# Patient Record
Sex: FEMALE | Race: WHITE | Employment: FULL TIME | URBAN - METROPOLITAN AREA
[De-identification: names, ages, dates, MRNs, and addresses within clinical notes are randomized per-mention and may not be internally consistent; named-entity substitution may affect disease eponyms.]

---

## 2017-01-02 ENCOUNTER — HOSPITAL ENCOUNTER (OUTPATIENT)
Dept: ULTRASOUND IMAGING | Facility: HOSPITAL | Age: 52
Discharge: HOME | End: 2017-01-02
Attending: OTOLARYNGOLOGY
Payer: COMMERCIAL

## 2017-01-02 ENCOUNTER — HOSPITAL ENCOUNTER (OUTPATIENT)
Dept: CT IMAGING | Facility: HOSPITAL | Age: 52
Discharge: HOME | End: 2017-01-02
Attending: OTOLARYNGOLOGY
Payer: COMMERCIAL

## 2017-01-02 ENCOUNTER — LAB ENCOUNTER (OUTPATIENT)
Dept: LAB | Facility: HOSPITAL | Age: 52
End: 2017-01-02
Attending: OTOLARYNGOLOGY
Payer: COMMERCIAL

## 2017-01-02 DIAGNOSIS — E21.3 HYPERPARATHYROIDISM (HCC): ICD-10-CM

## 2017-01-02 DIAGNOSIS — E21.3 HYPERPARATHYROIDISM, UNSPECIFIED (HCC): Primary | ICD-10-CM

## 2017-01-02 DIAGNOSIS — E07.9 THYROID DYSFUNCTION: ICD-10-CM

## 2017-01-02 DIAGNOSIS — D64.89 ALBRIGHT'S ANEMIA (ANEMIA ASSOCIATED WITH HYPERPARATHYROIDISM) (HCC): ICD-10-CM

## 2017-01-02 DIAGNOSIS — E21.3 ALBRIGHT'S ANEMIA (ANEMIA ASSOCIATED WITH HYPERPARATHYROIDISM) (HCC): ICD-10-CM

## 2017-01-02 DIAGNOSIS — E83.52 HYPERCALCEMIA: ICD-10-CM

## 2017-01-02 DIAGNOSIS — R74.8 ELEVATED SERUM GGT LEVEL: ICD-10-CM

## 2017-01-02 DIAGNOSIS — E04.1 THYROID NODULE: ICD-10-CM

## 2017-01-02 DIAGNOSIS — R79.89 LOW VITAMIN D LEVEL: ICD-10-CM

## 2017-01-02 LAB
25-HYDROXYVITAMIN D (TOTAL): 12 NG/ML (ref 30–100)
FREE T4: 0.8 NG/DL (ref 0.9–1.8)
GAMMA GLUTAMYL TRANSFERASE: 169 U/L (ref 5–55)
IONIZED CALCIUM: 1.36 MMOL/L (ref 1.12–1.32)
PTH-INTACT SERPL-MCNC: 110.9 PG/ML (ref 11.1–79.5)
TSI SER-ACNC: 0.8 MIU/ML (ref 0.35–5.5)

## 2017-01-02 PROCEDURE — 82977 ASSAY OF GGT: CPT

## 2017-01-02 PROCEDURE — 36415 COLL VENOUS BLD VENIPUNCTURE: CPT

## 2017-01-02 PROCEDURE — 70492 CT SFT TSUE NCK W/O & W/DYE: CPT

## 2017-01-02 PROCEDURE — 82330 ASSAY OF CALCIUM: CPT

## 2017-01-02 PROCEDURE — 84443 ASSAY THYROID STIM HORMONE: CPT

## 2017-01-02 PROCEDURE — 76536 US EXAM OF HEAD AND NECK: CPT

## 2017-01-02 PROCEDURE — 82306 VITAMIN D 25 HYDROXY: CPT

## 2017-01-02 PROCEDURE — 84439 ASSAY OF FREE THYROXINE: CPT

## 2017-01-02 PROCEDURE — 83970 ASSAY OF PARATHORMONE: CPT

## 2017-01-03 DIAGNOSIS — E55.9 VITAMIN D DEFICIENCY: ICD-10-CM

## 2017-01-03 DIAGNOSIS — E21.3 HYPERPARATHYROIDISM (HCC): Primary | ICD-10-CM

## 2017-01-03 DIAGNOSIS — R79.89 ELEVATED PTHRP LEVEL: ICD-10-CM

## 2017-01-03 RX ORDER — ERGOCALCIFEROL 1.25 MG/1
50000 CAPSULE ORAL WEEKLY
Qty: 8 CAPSULE | Refills: 0 | Status: SHIPPED | OUTPATIENT
Start: 2017-01-03 | End: 2017-02-02

## 2017-01-05 NOTE — PROGRESS NOTES
Quick Note:    Please inform us thyroid showing right 1.8cm nodule and 6mm nodule, left 6mm nodule recommend FNA to right 1.8cm nodule  ______

## 2017-01-05 NOTE — PROGRESS NOTES
Quick Note:    4DCT parathyroid is showing a possible parathyroid inferior to the right thyroid 9mm recommend follow up with Dr Oswaldo Estrella to review films and results and further discuss  ______

## 2017-01-05 NOTE — PATIENT INSTRUCTIONS
[address] MEDICINE    HAVING A URINARY CATHETER AFTER SURGERY    What is a urinary catheter? A catheter is a soft tube used to drain urine from your bladder. Why do I need a catheter?   A urinary catheter is used to help with heali following tips:  · Urinate normally then stand up, walk around for a minute or two, sit back down on the commode and attempt to urinate (double void). · Sit in a tub of warm water and try to urinate in the tub.   · Run warm water over your vaginal area whi are given an appointment to come back to discuss the results and any appropriate treatment recommendations. Please do not hesitate to contact our office with any questions or concerns at [phone].     I acknowledge that I have received verbal and wri

## 2017-01-10 ENCOUNTER — OFFICE VISIT (OUTPATIENT)
Dept: UROLOGY | Facility: HOSPITAL | Age: 52
End: 2017-01-10
Attending: OBSTETRICS & GYNECOLOGY
Payer: COMMERCIAL

## 2017-01-10 VITALS — DIASTOLIC BLOOD PRESSURE: 70 MMHG | SYSTOLIC BLOOD PRESSURE: 118 MMHG

## 2017-01-10 DIAGNOSIS — R35.0 FREQUENCY OF URINATION: Primary | ICD-10-CM

## 2017-01-10 DIAGNOSIS — N95.2 POSTMENOPAUSAL ATROPHIC VAGINITIS: ICD-10-CM

## 2017-01-10 LAB
CONTROL RUN WITHIN 24 HOURS?: YES
LEUKOCYTE ESTERASE URINE: NEGATIVE
NITRITE URINE: NEGATIVE

## 2017-01-10 PROCEDURE — 51741 ELECTRO-UROFLOWMETRY FIRST: CPT

## 2017-01-10 PROCEDURE — 51797 INTRAABDOMINAL PRESSURE TEST: CPT

## 2017-01-10 PROCEDURE — 51729 CYSTOMETROGRAM W/VP&UP: CPT

## 2017-01-10 PROCEDURE — 51784 ANAL/URINARY MUSCLE STUDY: CPT

## 2017-01-10 RX ORDER — ESTRADIOL 0.1 MG/G
CREAM VAGINAL
Qty: 1 TUBE | Refills: 3 | Status: SHIPPED | OUTPATIENT
Start: 2017-01-10

## 2017-01-10 NOTE — PROCEDURES
.Patient here for urodynamic testing. Procedure explained and confirmed by patient. See evaluation form for results. Both verbal and written discharge instructions were given.   Patient tolerated procedure well and will follow up with Dr. Jody Avalos Other  Void:   [x]  Typical  []  Atypical    Additional Notes:    CYSTOMETRY:  Urethral Catheter:  Fr 7 / tdoc  Abd Catheter:     Fr 7 / tdoc   Infusion:  Water Rate 50 mL/min  Temp:  Room  Position:  [x]  Sit  []  Stand  []  Supine  First sensation:   60 Ultrasound    []  Urinalysis  []  CT Abd/Pelvis    []  Culture  []  Pelvic Ultrasound/Sonohysterogram  []  Cystoscopy  []  Magnetic Resonance Imaging Scan    BLADDER AND BOWEL THERAPIES:  []  Timed voiding/Bladder re-training  []  Pelvic Floor Exercises  [

## 2017-01-24 NOTE — PROGRESS NOTES
Quick Note:    Pt returned phone call and went over results. Pt stated she was told by Hilton Castillo to f/u in office to discuss results and scheduled surgery.   ______

## 2017-01-24 NOTE — PROGRESS NOTES
Quick Note:    Pt returned phone call and went over results. Pt stated she was told by Kaila Thakur to f/u in office to discuss results and scheduled surgery.   ______

## 2017-02-22 ENCOUNTER — TELEPHONE (OUTPATIENT)
Dept: FAMILY MEDICINE CLINIC | Facility: CLINIC | Age: 52
End: 2017-02-22

## 2017-02-22 NOTE — TELEPHONE ENCOUNTER
Incoming letter received from [employer]. H&P request.  Patient is scheduled to have Right parathyroidectomy possible right thyroid lobectomy on 4/7/2017 with Dr. Augustin Dozier.   Outgoing call to patient, LM to CB, concerning pre-op exam.

## 2017-02-22 NOTE — TELEPHONE ENCOUNTER
I spoke with patient, appointment scheduled 3/23/2017 with Dino Dodd. Paperwork in pre-op folder(Gene).

## 2017-03-02 ENCOUNTER — HOSPITAL ENCOUNTER (OUTPATIENT)
Dept: ULTRASOUND IMAGING | Facility: HOSPITAL | Age: 52
Discharge: HOME | End: 2017-03-02
Attending: OTOLARYNGOLOGY
Payer: COMMERCIAL

## 2017-03-02 DIAGNOSIS — E04.1 THYROID NODULE: ICD-10-CM

## 2017-03-02 PROCEDURE — 76942 ECHO GUIDE FOR BIOPSY: CPT

## 2017-03-02 PROCEDURE — 10022 US FNA THYROID SH(CPT=10022/76942): CPT

## 2017-03-02 PROCEDURE — 88173 CYTOPATH EVAL FNA REPORT: CPT | Performed by: OTOLARYNGOLOGY

## 2017-03-02 NOTE — PROCEDURES
[facility]  Procedure Note    Oralia Ganser Patient Status:  Outpatient    1965 MRN [id number]   Location [address] [place] [address] Attending Messi Pedro MD   Hosp Day # 0 PCP Nicole Blizzard, MD     BIOPSY NEEDLE: 22 gauge  SPECIMEN T

## 2017-03-03 PROBLEM — K64.8 INTERNAL HEMORRHOIDS WITHOUT COMPLICATION: Status: ACTIVE | Noted: 2017-03-03

## 2017-03-14 NOTE — PROGRESS NOTES
Quick Note:    Left detailed message with results and recommendations on patient's VM, ok per [facility] phone consent. Order for thyroid US was mailed. Instructed patient to call back with any questions or concerns.   ______

## 2017-03-23 ENCOUNTER — TELEPHONE (OUTPATIENT)
Dept: FAMILY MEDICINE CLINIC | Facility: CLINIC | Age: 52
End: 2017-03-23

## 2017-03-23 ENCOUNTER — OFFICE VISIT (OUTPATIENT)
Dept: FAMILY MEDICINE CLINIC | Facility: CLINIC | Age: 52
End: 2017-03-23

## 2017-03-23 VITALS
DIASTOLIC BLOOD PRESSURE: 70 MMHG | RESPIRATION RATE: 16 BRPM | TEMPERATURE: 98 F | SYSTOLIC BLOOD PRESSURE: 108 MMHG | HEART RATE: 85 BPM | HEIGHT: 64 IN | BODY MASS INDEX: 29.71 KG/M2 | WEIGHT: 174 LBS | OXYGEN SATURATION: 98 %

## 2017-03-23 DIAGNOSIS — Z01.818 PREOPERATIVE CLEARANCE: Primary | ICD-10-CM

## 2017-03-23 DIAGNOSIS — Z01.810 PREOPERATIVE CARDIOVASCULAR EXAMINATION: ICD-10-CM

## 2017-03-23 DIAGNOSIS — E21.3 HYPERPARATHYROIDISM (HCC): ICD-10-CM

## 2017-03-23 PROCEDURE — 99242 OFF/OP CONSLTJ NEW/EST SF 20: CPT | Performed by: FAMILY MEDICINE

## 2017-03-23 NOTE — TELEPHONE ENCOUNTER
Please call patient. I spoke to Dr. Margaret Mosher about her upcoming surgery. He would like her to have an EKG preoperatively.   I have ordered it for [facility].

## 2017-03-23 NOTE — TELEPHONE ENCOUNTER
Incoming call from [facility](preadmission test dept), states patient has not had a pre-op medical survey yet, however looking over chart patient may not need any additional testing, unless PCP requests.   They will finalize that determination after they have c

## 2017-03-23 NOTE — H&P
Viry Wilks is a 45year old female who presents for a pre-operative physical exam. Patient is to have right parathyroidectomy with possible right thyroid lobectomy on April 14, 2017 at [facility] with Dr. Duyen Crow.     HPI:   Pre op H&P requested by EXAM:   /70 mmHg  Pulse 85  Temp(Src) 97.7 °F (36.5 °C) (Oral)  Resp 16  Ht 64\"  Wt 174 lb  BMI 29.85 kg/m2  SpO2 98%  GENERAL: well developed, well nourished,in no apparent distress  SKIN: no rashes,no suspicious lesions  HEENT: atraumatic, normo

## 2017-03-24 ENCOUNTER — OFFICE VISIT (OUTPATIENT)
Dept: UROLOGY | Facility: HOSPITAL | Age: 52
End: 2017-03-24
Attending: OBSTETRICS & GYNECOLOGY
Payer: COMMERCIAL

## 2017-03-24 VITALS
WEIGHT: 175 LBS | SYSTOLIC BLOOD PRESSURE: 114 MMHG | HEIGHT: 64 IN | BODY MASS INDEX: 29.88 KG/M2 | DIASTOLIC BLOOD PRESSURE: 70 MMHG

## 2017-03-24 DIAGNOSIS — R35.0 FREQUENCY OF URINATION: Primary | ICD-10-CM

## 2017-03-24 DIAGNOSIS — N39.41 URGE INCONTINENCE: ICD-10-CM

## 2017-03-24 DIAGNOSIS — R39.15 URINARY URGENCY: ICD-10-CM

## 2017-03-24 DIAGNOSIS — N95.2 POSTMENOPAUSAL ATROPHIC VAGINITIS: ICD-10-CM

## 2017-03-24 LAB
CONTROL RUN WITHIN 24 HOURS?: YES
LEUKOCYTE ESTERASE URINE: NEGATIVE
NITRITE URINE: NEGATIVE

## 2017-03-24 PROCEDURE — 81002 URINALYSIS NONAUTO W/O SCOPE: CPT

## 2017-03-24 PROCEDURE — 99211 OFF/OP EST MAY X REQ PHY/QHP: CPT

## 2017-03-24 PROCEDURE — 51700 IRRIGATION OF BLADDER: CPT

## 2017-03-24 NOTE — PROGRESS NOTES
Pt presents w/ initial c/o urinary frequency & urgency (reports pain as bladder fills)  Urodynamic testing undergone without complication.   Results reviewed with patient  161/60cc & 489/5cc  No DO  No SHALOM  [facility] 325cc    Discussed with patient mgmt options f

## 2017-03-24 NOTE — PROCEDURES
..Patient here for instill #1 . Patient reports feeling urgency and frequency, voids less than every hour. Instill given per protocol. Patient catheterized for a residual of 20 ml. Chemstrip performed. Patient tolerated procedure well.   Next instill s

## 2017-03-24 NOTE — PATIENT INSTRUCTIONS
[address] [facility] [address] MEDICINE    TIMED VOIDING    For women experiencing urinary incontinence from overactive bladder or pelvic weakness, retraining the bladder is of great importance. The bladder responds very well to this technique.   For papi candy or take small sips of water if you become thirsty instead of drinking large amounts. Diuretic medications such as high blood pressure medicine should be taken during the daytime if possible.     When you are able to hold your urine without difficulty

## 2017-03-28 ENCOUNTER — APPOINTMENT (OUTPATIENT)
Dept: LAB | Facility: HOSPITAL | Age: 52
End: 2017-03-28
Attending: FAMILY MEDICINE
Payer: COMMERCIAL

## 2017-03-28 DIAGNOSIS — Z01.810 PREOPERATIVE CARDIOVASCULAR EXAMINATION: ICD-10-CM

## 2017-03-28 LAB
ATRIAL RATE: 84 BPM
P AXIS: 72 DEGREES
P-R INTERVAL: 152 MS
Q-T INTERVAL: 360 MS
QRS DURATION: 80 MS
QTC CALCULATION (BEZET): 425 MS
R AXIS: 66 DEGREES
T AXIS: 60 DEGREES
VENTRICULAR RATE: 84 BPM

## 2017-03-28 PROCEDURE — 93005 ELECTROCARDIOGRAM TRACING: CPT

## 2017-03-28 PROCEDURE — 93010 ELECTROCARDIOGRAM REPORT: CPT | Performed by: INTERNAL MEDICINE

## 2017-03-30 ENCOUNTER — OFFICE VISIT (OUTPATIENT)
Dept: UROLOGY | Facility: HOSPITAL | Age: 52
End: 2017-03-30
Attending: OBSTETRICS & GYNECOLOGY
Payer: COMMERCIAL

## 2017-03-30 VITALS
BODY MASS INDEX: 29.88 KG/M2 | DIASTOLIC BLOOD PRESSURE: 76 MMHG | WEIGHT: 175 LBS | SYSTOLIC BLOOD PRESSURE: 112 MMHG | HEIGHT: 64 IN

## 2017-03-30 DIAGNOSIS — R35.0 URINARY FREQUENCY: Primary | ICD-10-CM

## 2017-03-30 LAB
CONTROL RUN WITHIN 24 HOURS?: YES
LEUKOCYTE ESTERASE URINE: NEGATIVE
NITRITE URINE: NEGATIVE

## 2017-03-30 PROCEDURE — 51700 IRRIGATION OF BLADDER: CPT

## 2017-03-30 PROCEDURE — 81002 URINALYSIS NONAUTO W/O SCOPE: CPT

## 2017-03-30 RX ORDER — LIDOCAINE HYDROCHLORIDE 20 MG/ML
10 JELLY TOPICAL ONCE
Status: COMPLETED | OUTPATIENT
Start: 2017-03-30 | End: 2017-03-30

## 2017-03-30 RX ORDER — HEPARIN SODIUM 10000 [USP'U]/ML
40000 INJECTION, SOLUTION INTRAVENOUS; SUBCUTANEOUS ONCE
Status: COMPLETED | OUTPATIENT
Start: 2017-03-30 | End: 2017-03-30

## 2017-03-30 RX ORDER — 0.9 % SODIUM CHLORIDE 0.9 %
17 VIAL (ML) INJECTION ONCE
Status: COMPLETED | OUTPATIENT
Start: 2017-03-30 | End: 2017-03-30

## 2017-03-30 RX ADMIN — LIDOCAINE HYDROCHLORIDE 10 ML: 20 JELLY TOPICAL at 10:36:00

## 2017-03-30 RX ADMIN — HEPARIN SODIUM 40000 UNITS: 10000 INJECTION, SOLUTION INTRAVENOUS; SUBCUTANEOUS at 10:36:00

## 2017-03-30 RX ADMIN — 0.9 % SODIUM CHLORIDE 17 ML: 0.9 % VIAL (ML) INJECTION at 10:36:00

## 2017-03-30 NOTE — PROCEDURES
.Patient here for instill #2. Patient reports feeling frequency has decreased since last week -. Instill given per protocol. Patient catheterized for a residual of 120ml. Chemstrip performed. Patient tolerated procedure well.   Next instill scheduled f

## 2017-04-06 ENCOUNTER — OFFICE VISIT (OUTPATIENT)
Dept: UROLOGY | Facility: HOSPITAL | Age: 52
End: 2017-04-06
Attending: OBSTETRICS & GYNECOLOGY
Payer: COMMERCIAL

## 2017-04-06 VITALS
RESPIRATION RATE: 16 BRPM | BODY MASS INDEX: 30 KG/M2 | WEIGHT: 175 LBS | SYSTOLIC BLOOD PRESSURE: 110 MMHG | DIASTOLIC BLOOD PRESSURE: 60 MMHG

## 2017-04-06 DIAGNOSIS — R35.0 URINARY FREQUENCY: Primary | ICD-10-CM

## 2017-04-06 DIAGNOSIS — N81.84 PELVIC MUSCLE WASTING: ICD-10-CM

## 2017-04-06 DIAGNOSIS — N39.41 URGE INCONTINENCE: ICD-10-CM

## 2017-04-06 DIAGNOSIS — M62.838 LEVATOR SPASM: ICD-10-CM

## 2017-04-06 PROCEDURE — 81002 URINALYSIS NONAUTO W/O SCOPE: CPT

## 2017-04-06 PROCEDURE — 51700 IRRIGATION OF BLADDER: CPT

## 2017-04-06 RX ORDER — LIDOCAINE HYDROCHLORIDE 20 MG/ML
10 JELLY TOPICAL ONCE
Status: COMPLETED | OUTPATIENT
Start: 2017-04-06 | End: 2017-04-06

## 2017-04-06 RX ORDER — HEPARIN SODIUM 10000 [USP'U]/ML
40000 INJECTION, SOLUTION INTRAVENOUS; SUBCUTANEOUS ONCE
Status: COMPLETED | OUTPATIENT
Start: 2017-04-06 | End: 2017-04-06

## 2017-04-06 RX ORDER — 0.9 % SODIUM CHLORIDE 0.9 %
17 VIAL (ML) INJECTION ONCE
Status: COMPLETED | OUTPATIENT
Start: 2017-04-06 | End: 2017-04-06

## 2017-04-06 RX ADMIN — LIDOCAINE HYDROCHLORIDE 10 ML: 20 JELLY TOPICAL at 09:43:00

## 2017-04-06 RX ADMIN — 0.9 % SODIUM CHLORIDE 17 ML: 0.9 % VIAL (ML) INJECTION at 09:44:00

## 2017-04-06 RX ADMIN — HEPARIN SODIUM 40000 UNITS: 10000 INJECTION, SOLUTION INTRAVENOUS; SUBCUTANEOUS at 09:43:00

## 2017-04-06 NOTE — PROGRESS NOTES
..Patient here for instill #3. Patient reports feeling less urgency and frequency, now voiding every 2+ hours. Instill given per protocol. Patient catheterized for a residual of 50 ml. Chemstrip performed. Patient tolerated procedure well.   Next inst

## 2017-04-13 ENCOUNTER — OFFICE VISIT (OUTPATIENT)
Dept: UROLOGY | Facility: HOSPITAL | Age: 52
End: 2017-04-13
Attending: OBSTETRICS & GYNECOLOGY
Payer: COMMERCIAL

## 2017-04-13 VITALS
BODY MASS INDEX: 29.88 KG/M2 | WEIGHT: 175 LBS | DIASTOLIC BLOOD PRESSURE: 78 MMHG | SYSTOLIC BLOOD PRESSURE: 118 MMHG | HEIGHT: 64 IN

## 2017-04-13 DIAGNOSIS — N95.2 POSTMENOPAUSAL ATROPHIC VAGINITIS: ICD-10-CM

## 2017-04-13 DIAGNOSIS — R35.0 URINARY FREQUENCY: Primary | ICD-10-CM

## 2017-04-13 DIAGNOSIS — M62.838 LEVATOR SPASM: ICD-10-CM

## 2017-04-13 PROCEDURE — 81002 URINALYSIS NONAUTO W/O SCOPE: CPT

## 2017-04-13 PROCEDURE — 51700 IRRIGATION OF BLADDER: CPT

## 2017-04-13 RX ORDER — LIDOCAINE HYDROCHLORIDE 20 MG/ML
10 JELLY TOPICAL ONCE
Status: COMPLETED | OUTPATIENT
Start: 2017-04-13 | End: 2017-04-13

## 2017-04-13 RX ORDER — 0.9 % SODIUM CHLORIDE 0.9 %
17 VIAL (ML) INJECTION ONCE
Status: COMPLETED | OUTPATIENT
Start: 2017-04-13 | End: 2017-04-13

## 2017-04-13 RX ORDER — HEPARIN SODIUM 10000 [USP'U]/ML
40000 INJECTION, SOLUTION INTRAVENOUS; SUBCUTANEOUS ONCE
Status: COMPLETED | OUTPATIENT
Start: 2017-04-13 | End: 2017-04-13

## 2017-04-13 RX ADMIN — 0.9 % SODIUM CHLORIDE 17 ML: 0.9 % VIAL (ML) INJECTION at 09:34:00

## 2017-04-13 RX ADMIN — HEPARIN SODIUM 40000 UNITS: 10000 INJECTION, SOLUTION INTRAVENOUS; SUBCUTANEOUS at 09:34:00

## 2017-04-13 RX ADMIN — LIDOCAINE HYDROCHLORIDE 10 ML: 20 JELLY TOPICAL at 09:34:00

## 2017-04-13 NOTE — PROCEDURES
.Patient here for instill # 4. Patient reports feeling like she can hold urine longer and her symptoms are tapering off. Instill given per protocol. Patient catheterized for a residual of 5 ml. Chemstrip performed. Patient tolerated procedure well.   Casilda Koyanagi

## 2017-04-14 ENCOUNTER — ANESTHESIA EVENT (OUTPATIENT)
Dept: SURGERY | Facility: HOSPITAL | Age: 52
End: 2017-04-14
Payer: COMMERCIAL

## 2017-04-14 ENCOUNTER — HOSPITAL ENCOUNTER (OUTPATIENT)
Facility: HOSPITAL | Age: 52
Discharge: HOME | End: 2017-04-15
Attending: OTOLARYNGOLOGY | Admitting: OTOLARYNGOLOGY
Payer: COMMERCIAL

## 2017-04-14 ENCOUNTER — ANESTHESIA (OUTPATIENT)
Dept: SURGERY | Facility: HOSPITAL | Age: 52
End: 2017-04-14
Payer: COMMERCIAL

## 2017-04-14 ENCOUNTER — SURGERY (OUTPATIENT)
Age: 52
End: 2017-04-14

## 2017-04-14 DIAGNOSIS — D35.1 BENIGN NEOPLASM OF PARATHYROID GLAND: ICD-10-CM

## 2017-04-14 DIAGNOSIS — E21.3 HYPERPARATHYROIDISM, UNSPECIFIED (HCC): ICD-10-CM

## 2017-04-14 DIAGNOSIS — E04.1 NONTOXIC UNINODULAR GOITER: ICD-10-CM

## 2017-04-14 PROCEDURE — 83970 ASSAY OF PARATHORMONE: CPT | Performed by: OTOLARYNGOLOGY

## 2017-04-14 PROCEDURE — 82310 ASSAY OF CALCIUM: CPT | Performed by: OTOLARYNGOLOGY

## 2017-04-14 PROCEDURE — 0GBN0ZZ EXCISION OF RIGHT INFERIOR PARATHYROID GLAND, OPEN APPROACH: ICD-10-PCS | Performed by: OTOLARYNGOLOGY

## 2017-04-14 PROCEDURE — 83735 ASSAY OF MAGNESIUM: CPT | Performed by: OTOLARYNGOLOGY

## 2017-04-14 PROCEDURE — 88305 TISSUE EXAM BY PATHOLOGIST: CPT | Performed by: OTOLARYNGOLOGY

## 2017-04-14 PROCEDURE — 88331 PATH CONSLTJ SURG 1 BLK 1SPC: CPT | Performed by: OTOLARYNGOLOGY

## 2017-04-14 RX ORDER — CALCIUM CARBONATE 500(1250)
1000 TABLET ORAL
Status: DISCONTINUED | OUTPATIENT
Start: 2017-04-14 | End: 2017-04-15

## 2017-04-14 RX ORDER — ACETAMINOPHEN 325 MG/1
650 TABLET ORAL EVERY 4 HOURS PRN
Status: DISCONTINUED | OUTPATIENT
Start: 2017-04-14 | End: 2017-04-15

## 2017-04-14 RX ORDER — HYDROMORPHONE HYDROCHLORIDE 1 MG/ML
0.4 INJECTION, SOLUTION INTRAMUSCULAR; INTRAVENOUS; SUBCUTANEOUS EVERY 5 MIN PRN
Status: DISCONTINUED | OUTPATIENT
Start: 2017-04-14 | End: 2017-04-14 | Stop reason: HOSPADM

## 2017-04-14 RX ORDER — DEXTROSE, SODIUM CHLORIDE, SODIUM LACTATE, POTASSIUM CHLORIDE, AND CALCIUM CHLORIDE 5; .6; .31; .03; .02 G/100ML; G/100ML; G/100ML; G/100ML; G/100ML
INJECTION, SOLUTION INTRAVENOUS CONTINUOUS
Status: DISCONTINUED | OUTPATIENT
Start: 2017-04-14 | End: 2017-04-15

## 2017-04-14 RX ORDER — CALCIUM CARBONATE 500(1250)
1000 TABLET ORAL ONCE
Status: COMPLETED | OUTPATIENT
Start: 2017-04-14 | End: 2017-04-14

## 2017-04-14 RX ORDER — ONDANSETRON 2 MG/ML
4 INJECTION INTRAMUSCULAR; INTRAVENOUS EVERY 6 HOURS PRN
Status: DISCONTINUED | OUTPATIENT
Start: 2017-04-14 | End: 2017-04-15

## 2017-04-14 RX ORDER — HYDROCODONE BITARTRATE AND ACETAMINOPHEN 5; 325 MG/1; MG/1
1 TABLET ORAL EVERY 4 HOURS PRN
Status: DISCONTINUED | OUTPATIENT
Start: 2017-04-14 | End: 2017-04-15

## 2017-04-14 RX ORDER — CALCITRIOL 0.25 UG/1
CAPSULE, LIQUID FILLED ORAL
Status: COMPLETED
Start: 2017-04-14 | End: 2017-04-14

## 2017-04-14 RX ORDER — CALCITRIOL 0.25 UG/1
0.25 CAPSULE, LIQUID FILLED ORAL ONCE
Status: DISCONTINUED | OUTPATIENT
Start: 2017-04-14 | End: 2017-04-14 | Stop reason: HOSPADM

## 2017-04-14 RX ORDER — BUPIVACAINE HYDROCHLORIDE AND EPINEPHRINE 5; 5 MG/ML; UG/ML
INJECTION, SOLUTION EPIDURAL; INTRACAUDAL; PERINEURAL AS NEEDED
Status: DISCONTINUED | OUTPATIENT
Start: 2017-04-14 | End: 2017-04-14 | Stop reason: HOSPADM

## 2017-04-14 RX ORDER — CALCITRIOL 0.25 UG/1
0.25 CAPSULE, LIQUID FILLED ORAL DAILY
Status: DISCONTINUED | OUTPATIENT
Start: 2017-04-14 | End: 2017-04-15

## 2017-04-14 RX ORDER — ONDANSETRON 2 MG/ML
4 INJECTION INTRAMUSCULAR; INTRAVENOUS AS NEEDED
Status: DISCONTINUED | OUTPATIENT
Start: 2017-04-14 | End: 2017-04-14 | Stop reason: HOSPADM

## 2017-04-14 RX ORDER — SODIUM CHLORIDE, SODIUM LACTATE, POTASSIUM CHLORIDE, CALCIUM CHLORIDE 600; 310; 30; 20 MG/100ML; MG/100ML; MG/100ML; MG/100ML
INJECTION, SOLUTION INTRAVENOUS CONTINUOUS
Status: DISCONTINUED | OUTPATIENT
Start: 2017-04-14 | End: 2017-04-15

## 2017-04-14 RX ORDER — METOCLOPRAMIDE HYDROCHLORIDE 5 MG/ML
10 INJECTION INTRAMUSCULAR; INTRAVENOUS AS NEEDED
Status: DISCONTINUED | OUTPATIENT
Start: 2017-04-14 | End: 2017-04-14 | Stop reason: HOSPADM

## 2017-04-14 RX ORDER — HYDROMORPHONE HYDROCHLORIDE 1 MG/ML
INJECTION, SOLUTION INTRAMUSCULAR; INTRAVENOUS; SUBCUTANEOUS
Status: COMPLETED
Start: 2017-04-14 | End: 2017-04-14

## 2017-04-14 RX ORDER — CALCIUM GLUCONATE-DEXTROSE IV SOLN 2 GRAM/100ML-5% 2-5/100 GM/ML-%
2 SOLUTION INTRAVENOUS ONCE AS NEEDED
Status: ACTIVE | OUTPATIENT
Start: 2017-04-14 | End: 2017-04-14

## 2017-04-14 RX ORDER — HYDROCODONE BITARTRATE AND ACETAMINOPHEN 5; 325 MG/1; MG/1
2 TABLET ORAL EVERY 4 HOURS PRN
Status: DISCONTINUED | OUTPATIENT
Start: 2017-04-14 | End: 2017-04-15

## 2017-04-14 RX ORDER — NALOXONE HYDROCHLORIDE 0.4 MG/ML
80 INJECTION, SOLUTION INTRAMUSCULAR; INTRAVENOUS; SUBCUTANEOUS AS NEEDED
Status: DISCONTINUED | OUTPATIENT
Start: 2017-04-14 | End: 2017-04-14 | Stop reason: HOSPADM

## 2017-04-14 RX ORDER — CALCIUM CARBONATE 500(1250)
TABLET ORAL
Status: COMPLETED
Start: 2017-04-14 | End: 2017-04-14

## 2017-04-14 NOTE — ANESTHESIA POSTPROCEDURE EVALUATION
[facility] Liana England Patient Status:  Outpatient in a Bed   Age/Gender 45year old female MRN [id number]   [facility] SURGERY Attending Gilda Harrington, [address] [facility] [address] Day # 0 PCP Agata Correia MD       Anesthesia Post-op Note

## 2017-04-14 NOTE — ANESTHESIA PREPROCEDURE EVALUATION
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 Neuro/Psych    Negative neuro/psych ROS.                                     Past Surgical History    RIKKI NEEDLE LOCALIZATION W/ SPECIMEN 1 SITE LEFT Left 2007    Comment be

## 2017-04-14 NOTE — INTERVAL H&P NOTE
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

## 2017-04-14 NOTE — H&P (VIEW-ONLY)
Rafael Sanches is a 45year old female who presents for a pre-operative physical exam. Patient is to have right parathyroidectomy with possible right thyroid lobectomy on April 14, 2017 at [facility] with Dr. Bryon Duke.     HPI:   Pre op H&P requested by EXAM:   /70 mmHg  Pulse 85  Temp(Src) 97.7 °F (36.5 °C) (Oral)  Resp 16  Ht 64\"  Wt 174 lb  BMI 29.85 kg/m2  SpO2 98%  GENERAL: well developed, well nourished,in no apparent distress  SKIN: no rashes,no suspicious lesions  HEENT: atraumatic, normo

## 2017-04-14 NOTE — INTERVAL H&P NOTE
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

## 2017-04-14 NOTE — BRIEF OP NOTE
[address] SURGERY  Brief Op Note     Clay Collier Location: OR   CSN [id number] MRN [id number]   Admission Date 4/14/2017 Operation Date 4/14/2017   Attending Physician Whit Wahl MD Operating Physician Audrey Velez MD       Pre-Operative D

## 2017-04-15 VITALS
RESPIRATION RATE: 18 BRPM | TEMPERATURE: 98 F | HEART RATE: 68 BPM | DIASTOLIC BLOOD PRESSURE: 57 MMHG | OXYGEN SATURATION: 96 % | HEIGHT: 64 IN | WEIGHT: 173 LBS | BODY MASS INDEX: 29.53 KG/M2 | SYSTOLIC BLOOD PRESSURE: 103 MMHG

## 2017-04-15 PROCEDURE — 83735 ASSAY OF MAGNESIUM: CPT | Performed by: OTOLARYNGOLOGY

## 2017-04-15 PROCEDURE — 82310 ASSAY OF CALCIUM: CPT | Performed by: OTOLARYNGOLOGY

## 2017-04-15 RX ORDER — CALCIUM CARBONATE/VITAMIN D3 600 MG-10
2 TABLET ORAL 2 TIMES DAILY
Qty: 120 TABLET | Refills: 0 | Status: SHIPPED | OUTPATIENT
Start: 2017-04-15 | End: 2017-05-15

## 2017-04-15 RX ORDER — HYDROCODONE BITARTRATE AND ACETAMINOPHEN 5; 325 MG/1; MG/1
1 TABLET ORAL EVERY 4 HOURS PRN
Qty: 10 TABLET | Refills: 0 | Status: SHIPPED | OUTPATIENT
Start: 2017-04-15 | End: 2017-05-09 | Stop reason: ALTCHOICE

## 2017-04-15 NOTE — PROGRESS NOTES
NURSING ADMISSION NOTE      Patient admitted via Cart  Oriented to room. Safety precautions initiated. Bed in low position. Call light in reach. Received from PACU awake, alert and oriented x3. SS dsg to anterior neck CDI. Ice pack applied.  Denies n

## 2017-04-15 NOTE — PLAN OF CARE
PAIN - ADULT    • Verbalizes/displays adequate comfort level or patient's stated pain goal Progressing        Verbalized has minimal pain to surgical anterior neck site. Anterior neck with steristrips intact, incision clean and dry, leave open to air.   Pa

## 2017-04-15 NOTE — PROGRESS NOTES
Patient is awake and alert s/p right parathyroidectomy. Voice is strong, denies any pain, dysphagia, numbness, tingling or muscle cramping. Pain is well tolerated. Eating and drinking is well tolerated.   Overall no significant complaints, patient doing v

## 2017-04-15 NOTE — OPERATIVE REPORT
[facility]    PATIENT'S NAME: John Myers   ATTENDING PHYSICIAN: Isabel Aragon M.D. OPERATING PHYSICIAN: Isabel Aragon M.D.    PATIENT ACCOUNT#:   [de-identified]    LOCATION:  [facility]  MEDICAL RECORD #:   [id number]       DATE OF GIULIANO Dictated By Cristopher Cantrell M.D.  d: 04/14/2017 18:48:32  t: 04/15/2017 00:41:31  Job [id number]  JJD/    cc: Cristopher Cantrell M.D.

## 2017-04-20 ENCOUNTER — OFFICE VISIT (OUTPATIENT)
Dept: UROLOGY | Facility: HOSPITAL | Age: 52
End: 2017-04-20
Attending: OBSTETRICS & GYNECOLOGY
Payer: COMMERCIAL

## 2017-04-20 VITALS
HEIGHT: 64 IN | DIASTOLIC BLOOD PRESSURE: 68 MMHG | BODY MASS INDEX: 29.02 KG/M2 | WEIGHT: 170 LBS | SYSTOLIC BLOOD PRESSURE: 112 MMHG | RESPIRATION RATE: 16 BRPM

## 2017-04-20 DIAGNOSIS — R35.0 FREQUENCY OF URINATION: ICD-10-CM

## 2017-04-20 DIAGNOSIS — R35.0 URINARY FREQUENCY: Primary | ICD-10-CM

## 2017-04-20 PROCEDURE — 51700 IRRIGATION OF BLADDER: CPT

## 2017-04-20 PROCEDURE — 81002 URINALYSIS NONAUTO W/O SCOPE: CPT

## 2017-04-20 RX ORDER — LIDOCAINE HYDROCHLORIDE 20 MG/ML
10 JELLY TOPICAL ONCE
Status: COMPLETED | OUTPATIENT
Start: 2017-04-20 | End: 2017-04-20

## 2017-04-20 RX ORDER — 0.9 % SODIUM CHLORIDE 0.9 %
17 VIAL (ML) INJECTION ONCE
Status: COMPLETED | OUTPATIENT
Start: 2017-04-20 | End: 2017-04-20

## 2017-04-20 RX ORDER — HEPARIN SODIUM 10000 [USP'U]/ML
40000 INJECTION, SOLUTION INTRAVENOUS; SUBCUTANEOUS ONCE
Status: COMPLETED | OUTPATIENT
Start: 2017-04-20 | End: 2017-04-20

## 2017-04-20 RX ADMIN — 0.9 % SODIUM CHLORIDE 17 ML: 0.9 % VIAL (ML) INJECTION at 09:44:00

## 2017-04-20 RX ADMIN — HEPARIN SODIUM 40000 UNITS: 10000 INJECTION, SOLUTION INTRAVENOUS; SUBCUTANEOUS at 09:44:00

## 2017-04-20 RX ADMIN — LIDOCAINE HYDROCHLORIDE 10 ML: 20 JELLY TOPICAL at 09:44:00

## 2017-04-20 NOTE — PROGRESS NOTES
..Patient here for instill # 5 . Patient reports feeling less urgency and freqeuncy, feels she can delay going to the bathroom as frequencly, can hold 3 hours. Instill given per protocol. Patient catheterized for a residual of  20 ml.   Chemstrip perform

## 2017-04-27 ENCOUNTER — OFFICE VISIT (OUTPATIENT)
Dept: UROLOGY | Facility: HOSPITAL | Age: 52
End: 2017-04-27
Attending: OBSTETRICS & GYNECOLOGY
Payer: COMMERCIAL

## 2017-04-27 VITALS
BODY MASS INDEX: 29.02 KG/M2 | DIASTOLIC BLOOD PRESSURE: 78 MMHG | HEIGHT: 64 IN | SYSTOLIC BLOOD PRESSURE: 128 MMHG | WEIGHT: 170 LBS

## 2017-04-27 DIAGNOSIS — N95.2 POSTMENOPAUSAL ATROPHIC VAGINITIS: ICD-10-CM

## 2017-04-27 DIAGNOSIS — M62.838 LEVATOR SPASM: ICD-10-CM

## 2017-04-27 DIAGNOSIS — R35.0 URINARY FREQUENCY: Primary | ICD-10-CM

## 2017-04-27 PROCEDURE — 51700 IRRIGATION OF BLADDER: CPT

## 2017-04-27 PROCEDURE — 81002 URINALYSIS NONAUTO W/O SCOPE: CPT

## 2017-04-27 RX ORDER — 0.9 % SODIUM CHLORIDE 0.9 %
17 VIAL (ML) INJECTION ONCE
Status: COMPLETED | OUTPATIENT
Start: 2017-04-27 | End: 2017-04-27

## 2017-04-27 RX ORDER — HEPARIN SODIUM 10000 [USP'U]/ML
40000 INJECTION, SOLUTION INTRAVENOUS; SUBCUTANEOUS ONCE
Status: COMPLETED | OUTPATIENT
Start: 2017-04-27 | End: 2017-04-27

## 2017-04-27 RX ORDER — LIDOCAINE HYDROCHLORIDE 20 MG/ML
10 JELLY TOPICAL ONCE
Status: COMPLETED | OUTPATIENT
Start: 2017-04-27 | End: 2017-04-27

## 2017-04-27 RX ADMIN — 0.9 % SODIUM CHLORIDE 17 ML: 0.9 % VIAL (ML) INJECTION at 10:30:00

## 2017-04-27 RX ADMIN — LIDOCAINE HYDROCHLORIDE 10 ML: 20 JELLY TOPICAL at 10:30:00

## 2017-04-27 RX ADMIN — HEPARIN SODIUM 40000 UNITS: 10000 INJECTION, SOLUTION INTRAVENOUS; SUBCUTANEOUS at 10:00:00

## 2017-04-27 NOTE — PROCEDURES
.Patient here for instill # 6. Patient reports feeling 50% less frequency. Instill given per protocol. Patient catheterized for a residual of 4 ml. Chemstrip performed. Patient tolerated procedure well.   Next appointment scheduled w/ Dr Quintin Carr in 2

## 2017-05-01 ENCOUNTER — APPOINTMENT (OUTPATIENT)
Dept: LAB | Age: 52
End: 2017-05-01
Attending: OTOLARYNGOLOGY
Payer: COMMERCIAL

## 2017-05-01 DIAGNOSIS — E21.3 HYPERPARATHYROIDISM, UNSPECIFIED (HCC): ICD-10-CM

## 2017-05-01 DIAGNOSIS — E89.2 S/P PARATHYROIDECTOMY (HCC): ICD-10-CM

## 2017-05-01 PROCEDURE — 83735 ASSAY OF MAGNESIUM: CPT

## 2017-05-01 PROCEDURE — 84439 ASSAY OF FREE THYROXINE: CPT

## 2017-05-01 PROCEDURE — 84443 ASSAY THYROID STIM HORMONE: CPT

## 2017-05-01 PROCEDURE — 36415 COLL VENOUS BLD VENIPUNCTURE: CPT

## 2017-05-01 PROCEDURE — 82310 ASSAY OF CALCIUM: CPT

## 2017-05-01 PROCEDURE — 83970 ASSAY OF PARATHORMONE: CPT

## 2017-05-04 NOTE — PROGRESS NOTES
Quick Note:    LMOM per HIPAA regarding test results and recommendations.  Placed lab order.  ______

## 2017-05-04 NOTE — PROGRESS NOTES
Quick Note:    Please inform ca/pth mg all normal ok to change ca to once daily and discontinue mg supplement tsh ft4 normal repeat in 6 months  ______

## 2017-05-09 ENCOUNTER — OFFICE VISIT (OUTPATIENT)
Dept: UROLOGY | Facility: HOSPITAL | Age: 52
End: 2017-05-09
Attending: OBSTETRICS & GYNECOLOGY
Payer: COMMERCIAL

## 2017-05-09 VITALS
HEIGHT: 64 IN | BODY MASS INDEX: 29.02 KG/M2 | DIASTOLIC BLOOD PRESSURE: 68 MMHG | SYSTOLIC BLOOD PRESSURE: 110 MMHG | WEIGHT: 170 LBS

## 2017-05-09 DIAGNOSIS — N95.2 POSTMENOPAUSAL ATROPHIC VAGINITIS: ICD-10-CM

## 2017-05-09 DIAGNOSIS — M62.838 LEVATOR SPASM: Primary | ICD-10-CM

## 2017-05-09 DIAGNOSIS — N39.41 URGE INCONTINENCE: ICD-10-CM

## 2017-05-09 PROCEDURE — 99211 OFF/OP EST MAY X REQ PHY/QHP: CPT

## 2017-05-09 NOTE — PROGRESS NOTES
Patient presents to follow up urinary urgency & frequency, vulvovag atrophy      She is currently using bladder diet/drill, vag estrogen cream (when she remembers), bladder instills x 6    She reports improvement, 60% overall  Happy with improvements    BP

## 2017-08-15 ENCOUNTER — LAB ENCOUNTER (OUTPATIENT)
Dept: LAB | Age: 52
End: 2017-08-15
Attending: OTOLARYNGOLOGY
Payer: COMMERCIAL

## 2017-08-15 DIAGNOSIS — E21.3 HYPERPARATHYROIDISM (HCC): ICD-10-CM

## 2017-08-15 DIAGNOSIS — E89.2 S/P PARATHYROIDECTOMY (HCC): ICD-10-CM

## 2017-08-15 PROCEDURE — 84439 ASSAY OF FREE THYROXINE: CPT

## 2017-08-15 PROCEDURE — 84443 ASSAY THYROID STIM HORMONE: CPT

## 2017-08-15 PROCEDURE — 36415 COLL VENOUS BLD VENIPUNCTURE: CPT

## 2017-08-16 LAB
FREE T4: 1.1 NG/DL (ref 0.9–1.8)
TSI SER-ACNC: 0.48 MIU/ML (ref 0.35–5.5)

## 2019-08-22 ENCOUNTER — TELEPHONE (OUTPATIENT)
Dept: FAMILY MEDICINE CLINIC | Facility: CLINIC | Age: 54
End: 2019-08-22

## 2019-08-22 NOTE — TELEPHONE ENCOUNTER
Patient Outreach, M for pt to call back and re-establish care with . Per chart pt is patient but there is no record of office visits.

## 2020-02-28 ENCOUNTER — TELEPHONE (OUTPATIENT)
Dept: FAMILY MEDICINE CLINIC | Facility: CLINIC | Age: 55
End: 2020-02-28

## 2020-12-31 NOTE — PROGRESS NOTES
Quick Note:    Pt returned phone call and went over results. Pt stated she was told by Jessica Broderick to f/u in office to discuss results and scheduled surgery.   ______ Spoke with  Will call back in regards to recommendations

## (undated) DEVICE — SUTURE VICRYL 3-0 SH

## (undated) DEVICE — AIRWAY ENDO  TRIVANTAGE 7MM

## (undated) DEVICE — PROBE 8225101 5PK STD PRASS FL TIP ROHS

## (undated) DEVICE — SOL  .9 1000ML BTL

## (undated) DEVICE — SUTURE MONOCRYL 4-0 PS-2

## (undated) DEVICE — SPONGE: SPECIALTY PEANUT XR 100/CS: Brand: MEDICAL ACTION INDUSTRIES

## (undated) DEVICE — KENDALL SCD EXPRESS SLEEVES, KNEE LENGTH, MEDIUM: Brand: KENDALL SCD

## (undated) DEVICE — GLOVE ALOETOUCH ORTHO SZ 8

## (undated) DEVICE — HEMOCLIP HORIZON MED 002200

## (undated) DEVICE — LIGACLIP EXTRA LIGATING CLIP CARTRIDGES: 6 TITANIUM CLIPS/ CARTRIDGE (SMALL): Brand: LIGACLIP

## (undated) DEVICE — 3M™ STERI-STRIP™ REINFORCED ADHESIVE SKIN CLOSURES, R1547, 1/2 IN X 4 IN (12 MM X 100 MM), 6 STRIPS/ENVELOPE: Brand: 3M™ STERI-STRIP™

## (undated) DEVICE — CAUTERY BLADE 2IN INS E1455

## (undated) DEVICE — PAD SACRAL SPAN AID

## (undated) DEVICE — MEDI-VAC SUCTION FINE CAPACITY: Brand: CARDINAL HEALTH

## (undated) DEVICE — RETRACT LONE STAR STAYS DULL

## (undated) DEVICE — HEAD AND NECK CDS-LF: Brand: MEDLINE INDUSTRIES, INC.

## (undated) NOTE — IP AVS SNAPSHOT
[facility] [place] [address] [place], [address] ~ [phone]                Discharge Summary   4/14/2017    Priscilla Filter           Admission Information        Provider Department    4/14/2017 Nuha Booth MD  3sw-A Notes to Patient:  May restart at home        Take by mouth.                                  Where to Get Your Medications      These medications were sent to [facility] [address] [place], [place] [address], [phone], [address]              Discharge Orders     Future Labs/Procedures Expected by Expires    [facility] CASE REQUEST SURGICAL  As directed     Questions:      Procedure requested time:  7:30 AM    Ed Cotter Additional Information       We are concerned for your overall well being:    - If you are a smoker or have smoked in the last 12 months, we encourage you to explore options for quitting.     - If you have concerns related to behavioral health issues or th Estradiol (ESTRACE) 0.1 MG/GM Vaginal Cream

## (undated) NOTE — MR AVS SNAPSHOT
[address]  [address]  [address] [place] [address]  [phone]               Thank you for choosing us for your health care visit with Jimbo Ruano.   We are glad to serve you and happy to provide you with this summar replaced, your nurse will discuss with you when you need to return. What do I do after the catheter is removed? · For the first 24 hours, you should go to the bathroom with your first urge or every 2-3 hours while you are awake.   · Urinate before you g in your urine. These problems should not last more than 24 hours. The following suggestions may minimize any symptoms you experience.     · Drink 6-8 large glasses of water over the next 8 hours  · A compress or sitz bath may be soothing  · Tylenol or Ibu This list is accurate as of: 1/10/17  1:21 PM.  Always use your most recent med list.                ergocalciferol 74819 UNITS Caps   Take 1 capsule (50,000 Units total) by mouth once a week. Commonly known as:  DRISDOL/VITAMIN D2           Estradiol 0.

## (undated) NOTE — MR AVS SNAPSHOT
After Visit Summary   4/27/2017    Georganne Gottron    MRN: [id number]           Visit Information        Provider Department Dept Phone    4/27/2017  9:30 AM [id number]  Urogynecology [facility] [phone]      Your Vitals Were     BP Ht Wt BMI       128/ experience and are looking for ways to make improvements. Your feedback will help us do so. For more information on [employer], please visit [email or web address]

## (undated) NOTE — MR AVS SNAPSHOT
[address]  [address]  [address] [place], [place] [address] [phone]               Thank you for choosing us for your health care visit with Amelia Chatman.   We are glad to serve you and happy to provide you with this summar [facility] [place] [phone]            Apr 20, 2017  9:30 AM   Bladder Instillation with [address] [facility] for Pelvic Medicine [facility])    [address] [address]  [address] [place] [address] Take by mouth.                    Results of Recent Testing     POCT URINALYSIS DIPSTICK      Component Value Standard Range & Units    control run Yes     Urine Color      Urine clarity      Glucose, urine  Negative    Bilirubin urine  Negative    Ketone u

## (undated) NOTE — MR AVS SNAPSHOT
[address]  [address]  [address] [place], [place] [address]  [phone]               Thank you for choosing us for your health care visit with Luis Escobedo DO.   We are glad to serve you and happy to provide you with t Healthy Diet and Regular Exercise  [employer] [facility] [employer] for making healthy food choices  -   Enjoy your food, but eat less. Fully enjoy your food when eating. Don’t eat while distracted and slow down. Avoid over sized portions.    Edwin Holden

## (undated) NOTE — MR AVS SNAPSHOT
[address]  [address]  [address] [place] [address]  [phone]               Thank you for choosing us for your health care visit with Ho Pena.   We are glad to serve you and happy to provide you with this summar Calcium Carbonate-Vitamin D 600-400 MG-UNIT Tabs   Take 2 tablets by mouth 2 (two) times daily.    Commonly known as:  CALTRATE 600+D           Estradiol 0.1 MG/GM Crea   1/2 gram twice weekly per vagina   Commonly known as:  ESTRACE           HYDROcodone-

## (undated) NOTE — MR AVS SNAPSHOT
[address]  [address]  [address] [place] [address]  [phone]               Thank you for choosing us for your health care visit with Radha Tuttle.   We are glad to serve you and happy to provide you with this summar pre-op/follow-up appointment scheduled with your doctor after your test is done,  before you leave the office.             May 01, 2017 10:30 AM   POSTOP with Israel Valenzuela MD   [facility], ENT - [address] [place] [address] ([address] [place] [address])    S

## (undated) NOTE — MR AVS SNAPSHOT
[facility]  [address] [facility]  [address]  [place] [address]  [phone]               Thank you for choosing us for your health care visit with Capri Lim DO.   We are glad to serve you and happy to provide you with t complete dryness. This does not work over night, you must be patient with yourself. This condition occurs over a period of time and will improve gradually with persistence. It is also not an “easy fix”.   You must be diligent in your efforts to have maxi timed voiding protocol or your symptoms may return,  Please discuss this with your health care provider so that (s)he may monitor your progress.  [facility] PELVIC MEDICINE         Bladder Diet    Directions:  Avoid all foods on the list for

## (undated) NOTE — MR AVS SNAPSHOT
After Visit Summary   4/13/2017    Eloy Garza    MRN: [id number]           Visit Information        Provider Department Dept Phone    4/13/2017  9:30 AM [id number]  Urogynecology [facility] [phone]      Your Vitals Were     BP Ht Wt BMI       118/

## (undated) NOTE — MR AVS SNAPSHOT
After Visit Summary   4/6/2017    Oralia Ganser    MRN: [id number]           Visit Information        Provider Department Dept Phone    4/6/2017  9:30 AM UROGYNRN1  Urogynecology [facility] [phone]      Your Vitals Were     BP Resp Wt             11

## (undated) NOTE — MR AVS SNAPSHOT
[facility], [address] [address] [phone]               Thank you for choosing us for your health care visit with Elliott Gandhi PA-C.   We are glad to serve you and happy to provide you with this Preoperative clearance    -  Primary    Preoperative cardiovascular examination          Instructions and Information about Your Health     None      Allergies as of Mar 23, 2017     No Known Allergies                Today's Vital Signs     BP Pulse Temp

## (undated) NOTE — Clinical Note
Consent to Procedure/Sedation    Date: __1/10/2017_____    Time: ___4:02 PM ___    1. I authorize the performance upon Waleska Martinez the following:  Urodynamics (UDS)      2.  Teresa Signs Jirschele(and whomever is designated as the [employer] ___________________________    ___________________    Witness: ____________________     Date: ______________    Printed: 2017   4:02 PM    Patient Name: Waleska Martinez        : 1965       Medical Record #: [id number]

## (undated) NOTE — MR AVS SNAPSHOT
After Visit Summary   4/20/2017    Betzaida Velez    MRN: [id number]           Visit Information        Provider Department Dept Phone    4/20/2017  9:30 AM [id number]  Urogynecology [facility] [phone]      Your Vitals Were     BP Resp Ht Wt BMI    11 will help us do so. For more information on [employer], please visit [email or web address]

## (undated) NOTE — MR AVS SNAPSHOT
[address]  [address]  [address] [place] [address]  [phone]               Thank you for choosing us for your health care visit with Larry Dumont.   We are glad to serve you and happy to provide you with this summar Calcium Carbonate-Vitamin D 600-400 MG-UNIT Tabs   Take 2 tablets by mouth 2 (two) times daily.    Commonly known as:  CALTRATE 600+D           Estradiol 0.1 MG/GM Crea   1/2 gram twice weekly per vagina   Commonly known as:  ESTRACE           HYDROcodone-

## (undated) NOTE — MR AVS SNAPSHOT
[address]  [address]  [address] [place] [address]  [phone]               Thank you for choosing us for your health care visit with Chema Maldonado.   We are glad to serve you and happy to provide you with this summar done,  before you leave the office.             Apr 27, 2017  9:30 AM   Bladder Instillation with [facility] for Pelvic Medicine [facility])    [address] [address],[facility] [address] [facility] [address]   6 PH URINE  5 - 8    Protein urine  Negative    Urobilinogen Urine  0.2-2.0    Nitrite Urine Negative Negative    Leukocyte esterase urine Negative Negative                  Medications Administered in the Office Today     Heparin Sodium (Porcine) injection